# Patient Record
(demographics unavailable — no encounter records)

---
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
## 2025-07-22 NOTE — PHYSICAL EXAM
[de-identified] :  Physical Exam:   General: No acute distress Respiratory: Nonlabored Cardiology: Warm and well perfused   Left forearm Healed transverse laceration volar forearm Full ROM but when reissted wrist flexion there is a ball of muslce that accunmulates around scar No numbness or tingling, WWP [de-identified] :  2 views of the left forearm were obtained and reviewed in clinic showing no fractures or dislocations, preserved joint spaces, no lesions, and no soft tissue abnormalities

## 2025-07-22 NOTE — PHYSICAL EXAM
[de-identified] :  Physical Exam:   General: No acute distress Respiratory: Nonlabored Cardiology: Warm and well perfused   Left forearm Healed transverse laceration volar forearm Full ROM but when reissted wrist flexion there is a ball of muslce that accunmulates around scar No numbness or tingling, WWP [de-identified] :  2 views of the left forearm were obtained and reviewed in clinic showing no fractures or dislocations, preserved joint spaces, no lesions, and no soft tissue abnormalities
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
